# Patient Record
Sex: MALE | Race: BLACK OR AFRICAN AMERICAN | NOT HISPANIC OR LATINO | URBAN - METROPOLITAN AREA
[De-identification: names, ages, dates, MRNs, and addresses within clinical notes are randomized per-mention and may not be internally consistent; named-entity substitution may affect disease eponyms.]

---
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
## 2025-07-18 NOTE — BH SAFETY PLAN - DISTRACTION PLACE 1
Due to recent gout, stop hydrochlorothiazide. Transition to lisinopril 40 mg daily. Recheck levels in 1 week.  Orders:  •  lisinopril (ZESTRIL) 40 MG tablet; Take 1 tablet by mouth daily.   school